# Patient Record
Sex: MALE | Race: BLACK OR AFRICAN AMERICAN | Employment: FULL TIME | URBAN - METROPOLITAN AREA
[De-identification: names, ages, dates, MRNs, and addresses within clinical notes are randomized per-mention and may not be internally consistent; named-entity substitution may affect disease eponyms.]

---

## 2020-06-23 ENCOUNTER — OFFICE VISIT (OUTPATIENT)
Dept: URGENT CARE | Facility: URGENT CARE | Age: 25
End: 2020-06-23
Payer: COMMERCIAL

## 2020-06-23 VITALS
SYSTOLIC BLOOD PRESSURE: 122 MMHG | TEMPERATURE: 98.4 F | OXYGEN SATURATION: 98 % | HEART RATE: 71 BPM | WEIGHT: 289 LBS | DIASTOLIC BLOOD PRESSURE: 80 MMHG

## 2020-06-23 DIAGNOSIS — M54.42 ACUTE LEFT-SIDED LOW BACK PAIN WITH LEFT-SIDED SCIATICA: Primary | ICD-10-CM

## 2020-06-23 PROCEDURE — 99203 OFFICE O/P NEW LOW 30 MIN: CPT | Performed by: FAMILY MEDICINE

## 2020-06-23 RX ORDER — METHYLPREDNISOLONE 4 MG
TABLET, DOSE PACK ORAL
Qty: 21 TABLET | Refills: 0 | Status: SHIPPED | OUTPATIENT
Start: 2020-06-23 | End: 2020-08-28

## 2020-06-23 RX ORDER — TRAMADOL HYDROCHLORIDE 50 MG/1
50 TABLET ORAL EVERY 6 HOURS PRN
Qty: 10 TABLET | Refills: 0 | Status: SHIPPED | OUTPATIENT
Start: 2020-06-23 | End: 2020-06-26

## 2020-06-23 RX ORDER — METHOCARBAMOL 750 MG/1
750 TABLET, FILM COATED ORAL 4 TIMES DAILY
Qty: 28 TABLET | Refills: 0 | Status: SHIPPED | OUTPATIENT
Start: 2020-06-23 | End: 2020-06-30

## 2020-06-23 NOTE — PROGRESS NOTES
SUBJECTIVEHPI: Joseph Mccabe is a 24 year old male who presents for evaluation of back pain.  Symptoms began weeks(s) ago, have been onset gradual and are stable.  Pain is located in the low back left region, with radiation to radiates into the left leg.  Recent injury:none recalled by the patient  Personal hx of back pain is recurrent self limited episodes of low back pain in the past.  Pain is exacerbated by: bending and changing position.  Pain is relieved by: none.  Associated sx include: none.  Red flag symptoms: negative.    No past medical history on file.  No Known Allergies  Social History     Tobacco Use     Smoking status: Never Smoker   Substance Use Topics     Alcohol use: Not on file       ROS:CONSTITUTIONAL:NEGATIVE for fever, chills, change in weight  INTEGUMENTARY/SKIN: NEGATIVE for worrisome rashes, moles or lesions  NEURO: NEGATIVE for weakness, dizziness or paresthesias    OBJECTIVE:  /80   Pulse 71   Temp 98.4  F (36.9  C) (Tympanic)   Wt 131.1 kg (289 lb)   SpO2 98%   Back examination: Back symmetric, no curvature. ROM normal. No CVA tenderness., positive findings: limitation of motion - flexion: Moderate and extension: Moderate, paraspinal muscle spasm, tenderness to palpation .  Straight leg raise test: positive.  GENERAL APPEARANCE: healthy, alert and no distress  ABDOMEN:  soft, nontender, no HSM or masses and bowel sounds normal  NEURO: Normal strength and tone with no weakness or sensory deficit noted, reflexes normal   SKIN: no suspicious lesions or rashes      ICD-10-CM    1. Acute left-sided low back pain with left-sided sciatica  M54.42 methylPREDNISolone (MEDROL DOSEPAK) 4 MG tablet therapy pack     methocarbamol (ROBAXIN) 750 MG tablet     traMADol (ULTRAM) 50 MG tablet     Continue prn heat or ice application.    F/U PCP/IM/FP if persists, ED if worse

## 2020-08-28 ENCOUNTER — ANCILLARY PROCEDURE (OUTPATIENT)
Dept: GENERAL RADIOLOGY | Facility: CLINIC | Age: 25
End: 2020-08-28
Attending: PHYSICIAN ASSISTANT
Payer: COMMERCIAL

## 2020-08-28 ENCOUNTER — OFFICE VISIT (OUTPATIENT)
Dept: INTERNAL MEDICINE | Facility: CLINIC | Age: 25
End: 2020-08-28
Payer: COMMERCIAL

## 2020-08-28 VITALS
DIASTOLIC BLOOD PRESSURE: 82 MMHG | RESPIRATION RATE: 18 BRPM | HEART RATE: 67 BPM | OXYGEN SATURATION: 98 % | SYSTOLIC BLOOD PRESSURE: 128 MMHG | WEIGHT: 298 LBS

## 2020-08-28 DIAGNOSIS — M54.42 LEFT-SIDED LOW BACK PAIN WITH LEFT-SIDED SCIATICA, UNSPECIFIED CHRONICITY: ICD-10-CM

## 2020-08-28 DIAGNOSIS — M54.42 LEFT-SIDED LOW BACK PAIN WITH LEFT-SIDED SCIATICA, UNSPECIFIED CHRONICITY: Primary | ICD-10-CM

## 2020-08-28 PROCEDURE — 99213 OFFICE O/P EST LOW 20 MIN: CPT | Performed by: PHYSICIAN ASSISTANT

## 2020-08-28 PROCEDURE — 72100 X-RAY EXAM L-S SPINE 2/3 VWS: CPT

## 2020-08-28 NOTE — PROGRESS NOTES
Bjorn Mccabe is a 25 year old male who presents to clinic today for the following health issues:    HPI       Back Pain  Onset/Duration: 4 months   Description:   Location of pain: low back left  Character of pain: sharp and dull ache  Pain radiation: radiates into the left buttocks, radiates into the left leg and radiates into the left foot  New numbness or weakness in legs, not attributed to pain: YES  Intensity: Currently 6/10  Progression of Symptoms: worsening  History:   Specific cause: unknown   Pain interferes with job: YES  History of back problems: yes  Any previous MRI or X-rays: None  Sees a specialist for back pain: No  Alleviating factors:   Improved by:   Nothing   Precipitating factors:  Worsened by: Lifting, Bending and Walking  Therapies tried and outcome: muscle relaxants, steroids, rest    Accompanying Signs & Symptoms:  Risk of Fracture: None  Risk of Cauda Equina: None  Risk of Infection: None  Risk of Cancer: None  Risk of Ankylosing Spondylitis: Onset at age <35, male, AND morning back stiffness no            Review of Systems   Constitutional, HEENT, cardiovascular, pulmonary, gi and gu systems are negative, except as otherwise noted.      Objective    /82   Pulse 67   Resp 18   Wt 135.2 kg (298 lb)   SpO2 98%   There is no height or weight on file to calculate BMI.  Physical Exam   GENERAL: healthy, alert and no distress  RESP: lungs clear to auscultation - no rales, rhonchi or wheezes  CV: regular rates and rhythm and normal S1 S2, no S3 or S4  MS: tenderness lower left paralumbar muscles   SLR with pain in the lower left back and partially down leg   SKIN: no suspicious lesions or rashes  NEURO: Normal strength and tone, sensory exam grossly normal and DTR's normal and symmetric     Xray - LUMBAR SPINE TWO TO THREE VIEWS   8/28/2020 11:27 AM      HISTORY: Left-sided low back pain with left-sided sciatica,  unspecified chronicity.     COMPARISON: None.                                                                       IMPRESSION: No fracture is identified. No high-grade degenerative  change. Alignment is significant for slight dextroconvex curvature.  Slight height loss of the T11 vertebral body, probably chronic.  Paraspinal soft tissues are unremarkable.        Assessment & Plan     Joseph was seen today for back pain.    Diagnoses and all orders for this visit:    Left-sided low back pain with left-sided sciatica, unspecified chronicity  -     XR Lumbar Spine 2/3 Views; Future  -     Orthopedic & Spine  Referral; Future         Tobacco Cessation:   reports that he has been smoking. He has never used smokeless tobacco.  Tobacco Cessation Action Plan: per PCP        See ortho spine specialist for further eval given length of symptoms     Return in about 3 months (around 11/28/2020) for Physical Exam, regular primary provider.    Magnolia Torrez PA-C  [facility]

## 2020-09-03 ENCOUNTER — OFFICE VISIT (OUTPATIENT)
Dept: NEUROSURGERY | Facility: CLINIC | Age: 25
End: 2020-09-03
Attending: PHYSICIAN ASSISTANT
Payer: COMMERCIAL

## 2020-09-03 VITALS
DIASTOLIC BLOOD PRESSURE: 81 MMHG | OXYGEN SATURATION: 99 % | SYSTOLIC BLOOD PRESSURE: 144 MMHG | BODY MASS INDEX: 41.44 KG/M2 | HEART RATE: 54 BPM | HEIGHT: 71 IN | WEIGHT: 296 LBS | RESPIRATION RATE: 16 BRPM

## 2020-09-03 DIAGNOSIS — M54.42 LEFT-SIDED LOW BACK PAIN WITH LEFT-SIDED SCIATICA, UNSPECIFIED CHRONICITY: ICD-10-CM

## 2020-09-03 PROCEDURE — G0463 HOSPITAL OUTPT CLINIC VISIT: HCPCS

## 2020-09-03 PROCEDURE — 99203 OFFICE O/P NEW LOW 30 MIN: CPT | Performed by: PHYSICIAN ASSISTANT

## 2020-09-03 ASSESSMENT — MIFFLIN-ST. JEOR: SCORE: 2336.84

## 2020-09-03 ASSESSMENT — PAIN SCALES - GENERAL: PAINLEVEL: EXTREME PAIN (8)

## 2020-09-03 NOTE — PROGRESS NOTES
Neurosurgery Consult    HPI    Mr. Mccabe is a 25-year-old male furred to us for evaluation of low back pain and left leg pain.  The patient states his symptoms have been present for 4 months they radiate down his left leg in an S1 distribution he has numbness in his third fourth and fifth toes on the left.  He has not tried any conservative therapies at this point he has had a lumbar x-ray which is essentially normal has not had a lumbar MRI.  Denies any bowel or bladder symptoms or saddle anesthesia.  Denies any right leg symptoms.    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      B/P: 144/81, T: Data Unavailable, P: 54, R: 16       Exam    Alert and oriented no acute distress  Bilateral lower extremities with 5/5 strength, with the exception of weakness in the left leg with plantar flexion while standing  Reflexes absent patella/ankle  Negative straight leg raise on the right, positive on the left  Negative ankle clonus negative Babinski bilaterally  Lumbar spine nontender to palpation  Gait is normal    Imaging    Lumbar x-ray is unremarkable    Assessment    Left S1 radiculopathy    Plan:      I recommend the patient undergo a lumbar MRI without contrast we will follow-up with him with the results once they are available.    Total time of 30 minutes spent with the patient today greater than 50% spent face to face in counseling and coordination of care.

## 2020-09-03 NOTE — LETTER
9/3/2020         RE: Joseph Mccabe  [address]  [facility] [place] [address]        Dear Colleague,    Thank you for referring your patient, Joseph Mccabe, to the [facility]. Please see a copy of my visit note below.    Neurosurgery Consult    HPI    Mr. Mccabe is a 25-year-old male furred to us for evaluation of low back pain and left leg pain.  The patient states his symptoms have been present for 4 months they radiate down his left leg in an S1 distribution he has numbness in his third fourth and fifth toes on the left.  He has not tried any conservative therapies at this point he has had a lumbar x-ray which is essentially normal has not had a lumbar MRI.  Denies any bowel or bladder symptoms or saddle anesthesia.  Denies any right leg symptoms.    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      B/P: 144/81, T: Data Unavailable, P: 54, R: 16       Exam    Alert and oriented no acute distress  Bilateral lower extremities with 5/5 strength, with the exception of weakness in the left leg with plantar flexion while standing  Reflexes absent patella/ankle  Negative straight leg raise on the right, positive on the left  Negative ankle clonus negative Babinski bilaterally  Lumbar spine nontender to palpation  Gait is normal    Imaging    Lumbar x-ray is unremarkable    Assessment    Left S1 radiculopathy    Plan:      I recommend the patient undergo a lumbar MRI without contrast we will follow-up with him with the results once they are available.    Total time of 30 minutes spent with the patient today greater than 50% spent face to face in counseling and coordination of care.    Again, thank you for allowing me to participate in the care of your patient.        Sincerely,        Lisa Morrison PA-C

## 2020-09-04 ENCOUNTER — HOSPITAL ENCOUNTER (OUTPATIENT)
Dept: MRI IMAGING | Facility: CLINIC | Age: 25
Discharge: HOME | End: 2020-09-04
Attending: PHYSICIAN ASSISTANT | Admitting: PHYSICIAN ASSISTANT
Payer: COMMERCIAL

## 2020-09-04 ENCOUNTER — TELEPHONE (OUTPATIENT)
Dept: NEUROSURGERY | Facility: CLINIC | Age: 25
End: 2020-09-04

## 2020-09-04 DIAGNOSIS — M54.42 LEFT-SIDED LOW BACK PAIN WITH LEFT-SIDED SCIATICA, UNSPECIFIED CHRONICITY: ICD-10-CM

## 2020-09-04 PROCEDURE — 72148 MRI LUMBAR SPINE W/O DYE: CPT

## 2020-09-04 NOTE — TELEPHONE ENCOUNTER
Per Lisa Morrison PA-C: Please have the patient make a follow-up appointment either with Dr. Francis at [facility], or with Dr. Thornton at [facility] to review his lumbar MRI and discuss possible surgical options.    You can let the patient know that he does have a disc herniation as we suspected at his office visit.  Visit could also be with a PA at a time when either of those above listed doctors are available.    Attempted to reach out to patient, no answer. Left voice message for patient to call clinic back to further discuss.

## 2020-09-14 ENCOUNTER — OFFICE VISIT (OUTPATIENT)
Dept: NEUROSURGERY | Facility: CLINIC | Age: 25
End: 2020-09-14
Attending: NEUROLOGICAL SURGERY
Payer: COMMERCIAL

## 2020-09-14 VITALS
WEIGHT: 296 LBS | BODY MASS INDEX: 42.37 KG/M2 | SYSTOLIC BLOOD PRESSURE: 142 MMHG | HEIGHT: 70 IN | DIASTOLIC BLOOD PRESSURE: 87 MMHG | OXYGEN SATURATION: 96 % | HEART RATE: 76 BPM | RESPIRATION RATE: 18 BRPM

## 2020-09-14 DIAGNOSIS — M54.16 SPINAL STENOSIS OF LUMBAR REGION WITH RADICULOPATHY: Primary | ICD-10-CM

## 2020-09-14 DIAGNOSIS — M48.061 SPINAL STENOSIS OF LUMBAR REGION WITH RADICULOPATHY: Primary | ICD-10-CM

## 2020-09-14 DIAGNOSIS — E66.01 MORBID OBESITY (H): ICD-10-CM

## 2020-09-14 PROCEDURE — G0463 HOSPITAL OUTPT CLINIC VISIT: HCPCS

## 2020-09-14 PROCEDURE — 99214 OFFICE O/P EST MOD 30 MIN: CPT | Performed by: NEUROLOGICAL SURGERY

## 2020-09-14 ASSESSMENT — PAIN SCALES - GENERAL: PAINLEVEL: SEVERE PAIN (7)

## 2020-09-14 ASSESSMENT — MIFFLIN-ST. JEOR: SCORE: 2328.9

## 2020-09-14 NOTE — LETTER
9/14/2020         RE: Joseph Mccabe  [address]  [facility] [place] [address]        Dear Colleague,    Thank you for referring your patient, Joseph Mccabe, to the [facility]. Please see a copy of my visit note below.    It was a pleasure to see Joseph Mccabe today in Neurosurgery Clinic. He is a 25 year old male who was recently seen by Lisa White PA-C in our clinic.  He has a six-month history of back and left lower extremity symptoms.  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  His symptoms have been up and down over that time.  He has pain in the back that then goes down into the buttock thigh calf and lateral foot with numbness and tingling in the lateral 3 toes.  He also has some pain on the anterior thigh.  He denies any obvious weakness.  He has minimal symptoms in the right lower extremity but does occasionally have some symptoms on that side.  He has tried a Medrol Dosepak with minimal relief.  He is also taken ibuprofen and Tylenol with mild relief.  He has not done physical therapy or an injection at this time.  He denies any bowel or bladder symptoms.  Activity exacerbates his symptoms.    History reviewed. No pertinent past medical history.  History reviewed. No pertinent surgical history.   No Known Allergies  No current outpatient medications on file.  Social History     Socioeconomic History     Marital status: Single     Spouse name: None     Number of children: None     Years of education: None     Highest education level: None   Occupational History     None   Social Needs     Financial resource strain: None     Food insecurity     Worry: None     Inability: None     Transportation needs     Medical: None     Non-medical: None   Tobacco Use     Smoking status: Current Some Day Smoker     Smokeless tobacco: Never Used     Tobacco comment: social smoker   Substance and Sexual Activity     Alcohol use: None     Drug  "use: None     Sexual activity: None   Lifestyle     Physical activity     Days per week: None     Minutes per session: None     Stress: None   Relationships     Social connections     Talks on phone: None     Gets together: None     Attends [employer] service: None     Active member of club or organization: None     Attends meetings of clubs or organizations: None     Relationship status: None     Intimate partner violence     Fear of current or ex partner: None     Emotionally abused: None     Physically abused: None     Forced sexual activity: None   Other Topics Concern     Parent/sibling w/ CABG, MI or angioplasty before 65F 55M? Not Asked   Social History Narrative     None      Problem (# of Occurrences) Relation (Name,Age of Onset)    Diabetes (1) Paternal Grandmother           ROS: 10 point ROS neg other than the symptoms noted above in the HPI.    Vitals:    09/14/20 1147   BP: (!) 142/87   Pulse: 76   Resp: 18   SpO2: 96%   Weight: 134.3 kg (296 lb)   Height: 1.778 m (5' 10\")     Body mass index is 42.47 kg/m .  Severe Pain (7)    Oswestry (ANN) Questionnaire    OSWESTRY DISABILITY INDEX 9/3/2020   Count 10   Sum 28   Oswestry Score (%) 56   Some recent data might be hidden       Visual Analog Scale (VAS) Questionnaire    No flowsheet data found.       Awake alert and oriented.  Bilateral lower extremity strength is 5 out of 5 in all muscle groups.  Sensation intact light touch.  Reflexes symmetric and normal.  Positive straight leg raise on the left.    Imaging: MRI of the lumbar spine demonstrates multilevel degenerative changes and disc bulges and herniations at L3-4 and L4-5.  The disc herniation on the left at L4-5 appears to be clearly concordant with his symptoms and he may have some mild symptoms related to his issues at L3-4.  The imaging was reviewed with the patient shown to the patient in clinic today.    Assessment: Lumbar stenosis and herniated disc with radiculopathy.    Plan: We will start " with epidural steroid injection and physical therapy, but given the duration of his symptoms we will also begin the scheduling process for a left L3-4, 4 5 minimally invasive decompression and discectomy, possible open here at [facility] in the near future.  We discussed the risk benefits and alternatives the surgery the patient understands this plan and wishes to proceed.        Again, thank you for allowing me to participate in the care of your patient.        Sincerely,        Alejandro Thornton MD

## 2020-09-14 NOTE — PATIENT INSTRUCTIONS
Patient Instructions    Surgery scheduled at [facility] for Left Lumbar 3-4,4-5 minimally invasive bilateral decompression and discectomy. Possible open      with Dr. Thornton     Pre-Operative    Surgical risks: blood clots in the leg or lung, problems urinating, nerve damage, drainage from the incision, infection,stiffness    Pre-operative physical with primary care physician within 30 days of surgical date.     Likely same day procedure with discharge home day of surgery, may stay for 23 hour observation hospitalization for monitoring.       Shower procedure  o Please shower with antimicrobial soap the night before surgery and morning of surgery. Please refer to showering instruction sheet in folder.    Eating/Drinking  o Stop all solid foods 8 hours before surgery.  o Keep drinking clear liquids until 4 hours before surgery  - Clear liquids include water, clear juice, black coffee, or clear tea without milk, Gatorade, clear soda.     Medications  o Hold Aspirin, NSAIDs (Advil/Ibuprofen, Indocin, Naproxen,Nuprin,Relafen/Nabumetone, Diclofenac,Meloxicam, Aleve, Celebrex) x 7 days prior to surgical date  o Hold methotrexate, Xeljanz for 1-2 weeks prior to surgery and continue to hold 2 weeks post surgery.   o You can take Tylenol (Acetaminophen) for pain, 1000 mg  - Do not exceed 3,000 mg per day   o Any other medications prescribed, please discuss with your primary care provider at your pre-operative physical     Pain Management    You will have some post-operative incisional pain which may require pain medications and muscle relaxants. You will receive medication upon discharge.    You may resume taking NSAIDs (ex. Ibuprofen, aleve, naproxen) immediately post-op    Do NOT drive while taking narcotic pain medication    Do NOT drink alcohol while using any pain medication    You can utilize ice as needed (no longer than 20 minutes at one time)    Incision Care    No submerging incision in water such as  pools, hot tubs, baths for at least 8 weeks or until incision is healed    It is okay to shower, just pat the incision dry     Remove dressing as instructed upon discharge    Watch for signs of infection  o Redness, swelling, warmth, drainage, and fever of 101 degrees or higher  o Notify clinic [phone].    Activity Restrictions    For the first 6-8 weeks, no lifting > 10 pounds, limited bending, twisting, or overhead reaching.    Take stairs in moderation     Ok to walk as tolerated, take short frequent walks. You may gradually increase the distance as tolerated.     Avoid bed rest and prolonged sitting for longer than 30 minutes (change positions frequently while awake)    No contact sports until after follow up visit    No high impact activities such as; running/jogging, snowmobile or 4 uribe riding or any other recreational vehicles    Please call the clinic if you develop any of the following symptoms:  o Swelling and/or warmth in one or both legs  o Pain or tenderness in your leg, ankle, foot, or arm   o Red or discolored skin     Post-Op Follow Up Appointments    2 week incision check with RN    6 week post op follow up visit with Physician Assistant    3 months post op with Dr. Thornton     Please call to schedule follow up appointment at [phone]    Resources    If you are currently employed, you will need to be off work for 2-4 weeks for post op recovery and healing.    Please fax any FMLA/short term disability paperwork to [phone]    You may call our clinic when you'd like to return to work and we can provide a work letter    To allow staff adequate time to complete paperwork, please send as soon as possible

## 2020-09-14 NOTE — PROGRESS NOTES
It was a pleasure to see Joseph Mccabe today in Neurosurgery Clinic. He is a 25 year old male who was recently seen by Lisa White PA-C in our clinic.  He has a six-month history of back and left lower extremity symptoms.  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  His symptoms have been up and down over that time.  He has pain in the back that then goes down into the buttock thigh calf and lateral foot with numbness and tingling in the lateral 3 toes.  He also has some pain on the anterior thigh.  He denies any obvious weakness.  He has minimal symptoms in the right lower extremity but does occasionally have some symptoms on that side.  He has tried a Medrol Dosepak with minimal relief.  He is also taken ibuprofen and Tylenol with mild relief.  He has not done physical therapy or an injection at this time.  He denies any bowel or bladder symptoms.  Activity exacerbates his symptoms.    History reviewed. No pertinent past medical history.  History reviewed. No pertinent surgical history.   No Known Allergies  No current outpatient medications on file.  Social History     Socioeconomic History     Marital status: Single     Spouse name: None     Number of children: None     Years of education: None     Highest education level: None   Occupational History     None   Social Needs     Financial resource strain: None     Food insecurity     Worry: None     Inability: None     Transportation needs     Medical: None     Non-medical: None   Tobacco Use     Smoking status: Current Some Day Smoker     Smokeless tobacco: Never Used     Tobacco comment: social smoker   Substance and Sexual Activity     Alcohol use: None     Drug use: None     Sexual activity: None   Lifestyle     Physical activity     Days per week: None     Minutes per session: None     Stress: None   Relationships     Social connections     Talks on phone: None     Gets together: None     Attends  "Congregation service: None     Active member of club or organization: None     Attends meetings of clubs or organizations: None     Relationship status: None     Intimate partner violence     Fear of current or ex partner: None     Emotionally abused: None     Physically abused: None     Forced sexual activity: None   Other Topics Concern     Parent/sibling w/ CABG, MI or angioplasty before 65F 55M? Not Asked   Social History Narrative     None      Problem (# of Occurrences) Relation (Name,Age of Onset)    Diabetes (1) Paternal Grandmother           ROS: 10 point ROS neg other than the symptoms noted above in the HPI.    Vitals:    09/14/20 1147   BP: (!) 142/87   Pulse: 76   Resp: 18   SpO2: 96%   Weight: 134.3 kg (296 lb)   Height: 1.778 m (5' 10\")     Body mass index is 42.47 kg/m .  Severe Pain (7)    Oswestry (ANN) Questionnaire    OSWESTRY DISABILITY INDEX 9/3/2020   Count 10   Sum 28   Oswestry Score (%) 56   Some recent data might be hidden       Visual Analog Scale (VAS) Questionnaire    No flowsheet data found.       Awake alert and oriented.  Bilateral lower extremity strength is 5 out of 5 in all muscle groups.  Sensation intact light touch.  Reflexes symmetric and normal.  Positive straight leg raise on the left.    Imaging: MRI of the lumbar spine demonstrates multilevel degenerative changes and disc bulges and herniations at L3-4 and L4-5.  The disc herniation on the left at L4-5 appears to be clearly concordant with his symptoms and he may have some mild symptoms related to his issues at L3-4.  The imaging was reviewed with the patient shown to the patient in clinic today.    Assessment: Lumbar stenosis and herniated disc with radiculopathy.    Plan: We will start with epidural steroid injection and physical therapy, but given the duration of his symptoms we will also begin the scheduling process for a left L3-4, 4 5 minimally invasive decompression and discectomy, possible open here at [facility] " in the near future.  We discussed the risk benefits and alternatives the surgery the patient understands this plan and wishes to proceed.

## 2020-09-14 NOTE — NURSING NOTE
"Joseph Mccabe is a 25 year old male who presents for:  Chief Complaint   Patient presents with     Neurologic Problem     LBP        Initial Vitals:  BP (!) 142/87   Pulse 76   Resp 18   Ht 5' 10\" (1.778 m)   Wt 296 lb (134.3 kg)   SpO2 96%   BMI 42.47 kg/m   Estimated body mass index is 42.47 kg/m  as calculated from the following:    Height as of this encounter: 5' 10\" (1.778 m).    Weight as of this encounter: 296 lb (134.3 kg).. Body surface area is 2.58 meters squared. BP completed using cuff size: large  Severe Pain (7)    Nursing Comments: Pt present today for LBP.    Kobe Farrell CMA    "

## 2020-09-14 NOTE — NURSING NOTE
Reviewed pre- and post-operative instructions as outlined in the After Visit Summary/Patient Instructions with patient.     Surgery folder was given to patient    Patient Education Topic: Procedure with Dr. Thornton     Learner(s): Patient    Knowledge Level: Basic    Readiness to Learn: Ready    Method:  Verbal explanation and Written material     Outcome: Able to verbalize instructions    Barriers to Learning: No barrier    Patient had the opportunity for questions to be answered. Advised Patient to call clinic with any questions/concerns. Gave patient antibacterial soap for pre-surgery skin preparation.

## 2020-09-17 NOTE — PROGRESS NOTES
[facility] - Procedure Note    Date of Service: 9/18/2020    Procedure performed: LEFT L4-5 transforaminal epidural steroid injection with fluoroscopic guidance  Diagnosis: Lumbar spondylosis; Lumbar radiculitis/radiculopathy  : Vero Wood MD   Anesthesia: none    Indications: Joseph Mccabe is a 25 year old male who is seen at the request of Dr. Thornton for lumbar transforaminal epidural steroid injection. The patient describes left sided low back pain with radiation into the lateral and posterior thigh, calf and into his foot on the left side. The patient has been exhibiting symptoms consistent with lumbar intraspinal inflammation and radiculopathy. Symptoms have been persistent, disabling, and intermittently severe. The patient reports minimal improvement with conservative treatment, including PT and medications.    Lumbar MRI was done on 9/4/2020 which showed:   FINDINGS: The report is dictated assuming five lumbar-type vertebral  bodies, and radiographic correlation may be necessary.  The distal  spinal cord and cauda equina appear normal.  The tip of the conus is  at the T12-L1 level.  The bone marrow appears normal.  The paraspinal  soft tissues appear normal. The patient has a developmentally small  central spinal canal.     T12-L1:   Normal disc, facet joints, spinal canal and neural foramina.      L1-L2:   Normal disc, facet joints, spinal canal and neural foramina.      L2-L3:   Normal disc, facet joints, spinal canal and neural foramina.      L3-L4: Loss of T2 signal from the disc. Loss of disc space height.  There is a moderate-sized broad-based central disc herniation  extending to the right and left of midline. There are mild  degenerative changes in the facet joints with thickening of the  ligamentum flavum. These degenerative changes are leading to severe  central spinal stenosis. There is also bilateral subarticular recess  stenosis. Mild left foraminal  stenosis.     L4-L5:  Loss of T2 signal from the disc. Loss of disc space height.  There is a moderate-sized left central disc herniation causing mass  effect on the dural sac. Disc material extends inferior to the level  of the disc into the left L5 lateral recess. This is displacing the  left L5 nerve root. These findings are best seen on axial images 37-39  and sagittal image 9. The disc fragment extending inferior to the  level of the disc measures about 1.3 cm in cephalocaudad dimension x  0.9 cm in transverse dimension and 0.7 cm in AP dimension. There is  severe central spinal stenosis at this level due to a developmentally  small canal and due to the disc herniation. Mild bilateral foraminal  stenosis.     L5-S1:  The disc has a normal appearance. There are mild degenerative  changes in the facet joints. The central canal and neural foramina are  patent.                                                                   IMPRESSION:    1. Left central L4-L5 disc herniation with a disc fragment extending  into the left L5 lateral recess. This fragment is displacing the left  L5 nerve root. There is also severe central stenosis at this level due  to a developmentally small canal and due to the disc herniation.  2. Moderate-sized central disc herniation at L3-L4. There is severe  central stenosis at this level due to the disc herniation, the  degenerative change off the facet joints, and the developmentally  small central canal.    Allergies:    No Known Allergies     Vitals:  BP (!) 144/93   Pulse 62   SpO2 98%     Review of Systems: The patient denies recent fever, chills, illness, use of antibiotics or anticoagulants. All other 10-point review of systems negative.     Procedure: The procedure and risks were explained, and informed written consent was obtained from the patient. Risks include but are not limited to: infection, bleeding, increased pain, and damage to soft tissue, nerve, muscle, and vasculature  structures. After getting informed consent, patient was brought into the procedure suite and was placed in a prone position on the procedure table. A Pause for the Cause was performed. Patient was prepped and draped in sterile fashion.     After identifying the left L4-5 neuroforamen, the C-arm was rotated to a left lateral oblique angle.  A total of 4.5 mL of Lidocaine 1% was used to anesthetize the skin and the needle track at a skin entry site coaxial with the fluoroscopy beam, and overriding the superior aspect of the neuroforamen.  A 22 gauge 5 inch spinal needle was advanced under intermittent fluoroscopy until it entered the foramen superiorly.    The position was then inspected from anteroposterior and lateral views, and the needle adjusted appropriately.  After negative aspiration, a total of 1 mL of Omnipaque-300 was injected using static and continuous fluoroscopy confirming appropriate position, with spread along the nerve root sheath and into the epidural space, with no intravascular or intrathecal uptake. 9 mL of Omnipaque-300 was wasted.    2mL of 1% lidocaine with 20 mg of dexamethasone was injected.  The needle was removed. Hemostasis was achieved, the area was cleaned, and bandaids were placed when appropriate. Images were saved to PACS.    The patient tolerated the procedure well, and was taken to the recovery room, and there was no evidence of procedural complications. No new sensory or motor deficits were noted following the procedure. The patient was stable and able to ambulate on discharge home. Post-procedure instructions were provided.     Pre-procedure pain score: 8/10 in the back, 7/10 in the leg  Post-procedure pain score: 5/10 in the back, 4/10 in the leg    Assessment/Plan: Joseph Mccabe is a 25 year old male s/p LEFT L4-5 transforaminal epidural steroid injection today for lumbar spondylosis, radiculitis/radiculopathy.     1. Following today's procedure, the patient was advised to  contact the Pain Management Center for any of the following:   Fever, chills, or night sweats   New onset of pain, numbness, or weakness   Any questions/concerns regarding the procedure  If unable to contact the Pain Center, the patient was instructed to go to a local Emergency Room for any complications.   2. The patient will receive a follow-up call in 1 week.  3. The patient should follow-up with the referring provider in 2 weeks for post-procedure evaluation.      TRAE MATOS MD   Pain Management & Addiction Medicine

## 2020-09-18 ENCOUNTER — ANCILLARY PROCEDURE (OUTPATIENT)
Dept: GENERAL RADIOLOGY | Facility: CLINIC | Age: 25
End: 2020-09-18
Attending: ANESTHESIOLOGY
Payer: COMMERCIAL

## 2020-09-18 ENCOUNTER — RADIOLOGY INJECTION OFFICE VISIT (OUTPATIENT)
Dept: PALLIATIVE MEDICINE | Facility: CLINIC | Age: 25
End: 2020-09-18
Attending: NEUROLOGICAL SURGERY
Payer: COMMERCIAL

## 2020-09-18 VITALS — DIASTOLIC BLOOD PRESSURE: 90 MMHG | HEART RATE: 60 BPM | SYSTOLIC BLOOD PRESSURE: 131 MMHG | OXYGEN SATURATION: 99 %

## 2020-09-18 DIAGNOSIS — M54.16 LUMBAR RADICULOPATHY: ICD-10-CM

## 2020-09-18 DIAGNOSIS — M54.16 LUMBAR RADICULOPATHY: Primary | ICD-10-CM

## 2020-09-18 DIAGNOSIS — M54.16 SPINAL STENOSIS OF LUMBAR REGION WITH RADICULOPATHY: ICD-10-CM

## 2020-09-18 DIAGNOSIS — M48.061 SPINAL STENOSIS OF LUMBAR REGION WITH RADICULOPATHY: ICD-10-CM

## 2020-09-18 PROCEDURE — 64483 NJX AA&/STRD TFRM EPI L/S 1: CPT | Mod: LT | Performed by: ANESTHESIOLOGY

## 2020-09-18 NOTE — NURSING NOTE
Discharge Information    IV Discontiued Time:  NA    Amount of Fluid Infused:  NA    Discharge Criteria = When patient returns to baseline or as per MD order    Consciousness:  Pt is fully awake    Circulation:  BP +/- 20% of pre-procedure level    Respiration:  Patient is able to breathe deeply    O2 Sat:  Patient is able to maintain O2 Sat >92% on room air    Activity:  Moves 4 extremities on command    Ambulation:  Patient is able to stand and walk or stand and pivot into wheelchair    Dressing:  Clean/dry or No Dressing    Notes:   Discharge instructions and AVS given to patient    Patient meets criteria for discharge?  YES    Admitted to PCU?  No    Responsible adult present to accompany patient home?  Yes    Signature/Title:    Aileen Munoz RN Care Coordinator  [facility] [place]

## 2020-09-18 NOTE — PATIENT INSTRUCTIONS
[facility] Procedure Discharge Instructions    Today you saw:   Dr. Vero Wood      Your procedure:  Epidural steroid injection       Medications used:  Lidocaine (anesthetic) Dexamethasone (steroid)       Omnipaque (contrast)              Be cautious when walking as numbness and/or weakness in the legs may occur up to 6-8 hours after the procedure due to effect of the local anesthetic    Do not drive for 6 hours. The effect of the local anesthetic could slow your reflexes.     Avoid strenuous activity for the first 24 hours. You may resume your regular activities after that.     You may shower, however avoid swimming, tub baths or hot tubs for 24 hours following your procedure    You may have a mild to moderate increase in pain for several days following the injection.      You may use ice packs for 10-15 minutes, 3 to 4 times a day at the injection site for comfort    Do not use heat to painful areas for 6 to 8 hours. This will give the local anesthetic time to wear off and prevent you from accidentally burning your skin.    Unless you have been directed to avoid the use of anti-inflammatory medications (NSAIDS-ibuprofen, Aleve, Motrin), you may use these medications or Tylenol for pain control if needed.     With diabetes, check your blood sugar more frequently than usual as your blood sugar may be higher than normal for 10-14 days following a steroid injection. Contact your doctor who manages your diabetes if your blood sugar is higher than usual    Possible side effects of steroids that you may experience include flushing, elevated blood pressure, increased appetite, mild headaches and restlessness.  All of these symptoms will get better with time.    It may take up to 14 days for the steroid medication to start working although you may feel the effect as early as a few days after the procedure.     Follow up with your referring provider in 2-3 weeks      If you experience any of the  following, call the pain center line during work hours at [phone] or on-call physician after hours at [phone]:  -Fever over 100 degree F  -Swelling, bleeding, redness, drainage, warmth at the injection site  -Progressive weakness or numbness in your legs   -Loss of bowel or bladder function  -Unusual headache that is not relieved by Tylenol or your regular headache medication  -Unusual new onset of pain that is not improving

## 2020-09-21 ENCOUNTER — THERAPY VISIT (OUTPATIENT)
Dept: PHYSICAL THERAPY | Facility: CLINIC | Age: 25
End: 2020-09-21
Attending: NEUROLOGICAL SURGERY
Payer: COMMERCIAL

## 2020-09-21 DIAGNOSIS — M54.16 SPINAL STENOSIS OF LUMBAR REGION WITH RADICULOPATHY: ICD-10-CM

## 2020-09-21 DIAGNOSIS — G89.29 CHRONIC LEFT-SIDED LOW BACK PAIN WITH LEFT-SIDED SCIATICA: Primary | ICD-10-CM

## 2020-09-21 DIAGNOSIS — M48.061 SPINAL STENOSIS OF LUMBAR REGION WITH RADICULOPATHY: ICD-10-CM

## 2020-09-21 DIAGNOSIS — M54.42 CHRONIC LEFT-SIDED LOW BACK PAIN WITH LEFT-SIDED SCIATICA: Primary | ICD-10-CM

## 2020-09-21 PROCEDURE — 97110 THERAPEUTIC EXERCISES: CPT | Mod: GP | Performed by: PHYSICAL THERAPIST

## 2020-09-21 PROCEDURE — 97161 PT EVAL LOW COMPLEX 20 MIN: CPT | Mod: GP | Performed by: PHYSICAL THERAPIST

## 2020-09-21 NOTE — PROGRESS NOTES
[facility] Initial Evaluation  Subjective:  The history is provided by the patient. No  was used.   Patient Health History  Joseph Mccabe being seen for LBP.     Problem began: 3/21/2020.   Problem occurred: possible from lifting things   Pain is reported as 5/10 on pain scale.  General health as reported by patient is good.  Health conditions: see EPIC.   Red flags:  None as reported by patient.     Surgeries include:  None.    Current medications: see EPIC.    Current occupation is not working right now (was working in [email or web address] about 6 weeks ago).                     Therapist Generated HPI Evaluation         Type of problem:  Lumbar.    This is a chronic condition.  Condition occurred with:  Lifting.    Patient reports pain:  Lumbar spine left, lower lumbar spine and central lumbar spine.    Pain radiates to:  Gluteals left, thigh left and foot left.     Associated symptoms:  Loss of strength, loss of motion/stiffness, numbness and tingling. Symptoms are exacerbated by lifting (prolonged sitting 45-60)  Relieved by: injection.                              Objective:  Standing Alignment:        Lumbar:  Lordosis incr and anterior pelvic tilt                           Lumbar/SI Evaluation  ROM:  Arom wnl lumbar: prone lying better during, VY good during, better afterwards.  AROM Lumbar:   Flexion:          Min loss, feels weird into Left leg  Ext:                    Min loss central Low back   Side Bend:        Left:  WNL no limits    Right:  WNL no limits  Rotation:           Left:     Right:   Side Glide:        Left:     Right:         Strength: negative heel walks, slight + L toe walks with L heel dropping a little  Lumbar Myotomes:    T12-L3 (Hip Flex):  Left: 5    Right: 5  L2-4 (Quads):  Left:  5    Right:  5  L4 (Ankle DF):  Left:  5    Right:  5    S1 (Toe Raise):  Left: 4+    Right: 5        Neural Tension/Mobility:    Left side:  SLR (slight tension at 60 deg)  positive.     Right side:   SLR  negative.                                                        General     ROS    Assessment/Plan:    Patient is a 25 year old male with lumbar complaints.    Patient has the following significant findings with corresponding treatment plan.                Diagnosis 1:  LBP chronic    Pain -  hot/cold therapy, splint/taping/bracing/orthotics, self management, education, directional preference exercise and home program  Decreased ROM/flexibility - manual therapy and therapeutic exercise  Decreased strength - therapeutic exercise and therapeutic activities  Decreased function - therapeutic activities  Impaired posture - neuro re-education    Previous and current functional limitations:  (See Goal Flow Sheet for this information)    Short term and Long term goals: (See Goal Flow Sheet for this information)     Communication ability:  Patient appears to be able to clearly communicate and understand verbal and written communication and follow directions correctly.  Treatment Explanation - The following has been discussed with the patient:   RX ordered/plan of care  Anticipated outcomes  Possible risks and side effects  This patient would benefit from PT intervention to resume normal activities.   Rehab potential is good.    Frequency:  1 X week, once daily  Duration:  for 6 weeks  Discharge Plan:  Achieve all LTG.  Independent in home treatment program.  Reach maximal therapeutic benefit.    Please refer to the daily flowsheet for treatment today, total treatment time and time spent performing 1:1 timed codes.

## 2020-09-28 ENCOUNTER — THERAPY VISIT (OUTPATIENT)
Dept: PHYSICAL THERAPY | Facility: CLINIC | Age: 25
End: 2020-09-28
Payer: COMMERCIAL

## 2020-09-28 DIAGNOSIS — M54.16 SPINAL STENOSIS OF LUMBAR REGION WITH RADICULOPATHY: ICD-10-CM

## 2020-09-28 DIAGNOSIS — M48.061 SPINAL STENOSIS OF LUMBAR REGION WITH RADICULOPATHY: ICD-10-CM

## 2020-09-28 DIAGNOSIS — M54.42 CHRONIC LEFT-SIDED LOW BACK PAIN WITH LEFT-SIDED SCIATICA: ICD-10-CM

## 2020-09-28 DIAGNOSIS — G89.29 CHRONIC LEFT-SIDED LOW BACK PAIN WITH LEFT-SIDED SCIATICA: ICD-10-CM

## 2020-09-28 PROCEDURE — 97110 THERAPEUTIC EXERCISES: CPT | Mod: GP | Performed by: PHYSICAL THERAPIST

## 2020-09-28 PROCEDURE — 97112 NEUROMUSCULAR REEDUCATION: CPT | Mod: GP | Performed by: PHYSICAL THERAPIST

## 2020-09-29 ENCOUNTER — TELEPHONE (OUTPATIENT)
Dept: PALLIATIVE MEDICINE | Facility: CLINIC | Age: 25
End: 2020-09-29

## 2020-09-29 NOTE — TELEPHONE ENCOUNTER
Patient had a  LEFT L4-5 transforaminal epidural steroid injection   on 9/18/20.  Called patient for an update.      Left message that we were calling for an update about how he was doing after the injection.  LM that if he has any problems or questions to call the clinic at [phone].

## 2021-01-22 PROBLEM — M48.061 SPINAL STENOSIS OF LUMBAR REGION WITH RADICULOPATHY: Status: RESOLVED | Noted: 2020-09-14 | Resolved: 2021-01-22

## 2021-01-22 PROBLEM — G89.29 CHRONIC LEFT-SIDED LOW BACK PAIN WITH LEFT-SIDED SCIATICA: Status: RESOLVED | Noted: 2020-09-21 | Resolved: 2021-01-22

## 2021-01-22 PROBLEM — M54.16 SPINAL STENOSIS OF LUMBAR REGION WITH RADICULOPATHY: Status: RESOLVED | Noted: 2020-09-14 | Resolved: 2021-01-22

## 2021-01-22 PROBLEM — M54.42 CHRONIC LEFT-SIDED LOW BACK PAIN WITH LEFT-SIDED SCIATICA: Status: RESOLVED | Noted: 2020-09-21 | Resolved: 2021-01-22

## 2021-01-22 NOTE — PROGRESS NOTES
Discharge Note    Progress reporting period is from last progress note on   to Sep 28, 2020.    Joseph failed to follow up and current status is unknown.  Please see information below for last relevant information on current status.  Patient seen for 2 visits.    SUBJECTIVE  Subjective changes noted by patient:  back is feeilng better - lots better.  some knee pain lateral fat pad, pain with bending knee and going up stairs.   Changes in function:  Yes (See Goal flowsheet attached for changes in current functional level)  Adverse reaction to treatment or activity: None    OBJECTIVE  Changes noted in objective findings: very good tolerance to prone ROM - ok to progress strength     ASSESSMENT/PLAN  Diagnosis: LBP, s/p injection last Friday   Updated problem list and treatment plan:   Pain - HEP  Decreased ROM/flexibility - HEP  STG/LTGs have been met or progress has been made towards goals:  Yes, please see goal flowsheet for most current information  Assessment of Progress: current status is unknown.    Last current status:     Self Management Plans:  HEP  I have re-evaluated this patient and find that the nature, scope, duration and intensity of the therapy is appropriate for the medical condition of the patient.  Joseph continues to require the following intervention to meet STG and LTG's:  HEP.    Recommendations:  Discharge with current home program.  Patient to follow up with MD as needed.    Please refer to the daily flowsheet for treatment today, total treatment time and time spent performing 1:1 timed codes.

## 2021-03-01 ENCOUNTER — ANCILLARY PROCEDURE (OUTPATIENT)
Dept: GENERAL RADIOLOGY | Facility: CLINIC | Age: 26
End: 2021-03-01
Attending: FAMILY MEDICINE
Payer: COMMERCIAL

## 2021-03-01 ENCOUNTER — OFFICE VISIT (OUTPATIENT)
Dept: ORTHOPEDICS | Facility: CLINIC | Age: 26
End: 2021-03-01
Payer: COMMERCIAL

## 2021-03-01 VITALS — HEIGHT: 71 IN | BODY MASS INDEX: 40.88 KG/M2 | WEIGHT: 292 LBS

## 2021-03-01 DIAGNOSIS — G89.29 CHRONIC PAIN OF LEFT KNEE: ICD-10-CM

## 2021-03-01 DIAGNOSIS — M76.52 PATELLAR TENDINITIS, LEFT KNEE: ICD-10-CM

## 2021-03-01 DIAGNOSIS — M25.562 CHRONIC PAIN OF LEFT KNEE: ICD-10-CM

## 2021-03-01 DIAGNOSIS — G89.29 CHRONIC PAIN OF LEFT KNEE: Primary | ICD-10-CM

## 2021-03-01 DIAGNOSIS — M25.562 CHRONIC PAIN OF LEFT KNEE: Primary | ICD-10-CM

## 2021-03-01 PROCEDURE — 99204 OFFICE O/P NEW MOD 45 MIN: CPT | Performed by: FAMILY MEDICINE

## 2021-03-01 PROCEDURE — 73562 X-RAY EXAM OF KNEE 3: CPT | Mod: LT | Performed by: RADIOLOGY

## 2021-03-01 RX ORDER — DICLOFENAC SODIUM 75 MG/1
75 TABLET, DELAYED RELEASE ORAL 2 TIMES DAILY
Qty: 28 TABLET | Refills: 0 | Status: SHIPPED | OUTPATIENT
Start: 2021-03-01

## 2021-03-01 ASSESSMENT — MIFFLIN-ST. JEOR: SCORE: 2326.63

## 2021-03-01 NOTE — LETTER
3/1/2021         RE: Joseph Mccabe  [address]  [place] [address]        Dear Colleague,    Thank you for referring your patient, Joseph Mccabe, to the [facility] [place]. Please see a copy of my visit note below.          SPORTS & ORTHOPEDIC WALK-IN VISIT 3/1/2021    Primary Care Physician: Keyla Fernando    10+ months ago started to notice L knee pain.   Does notice a pocket of swelling on the anterior lateral aspect of his L knee.  This will cause him pain as he WB and does strenuous activity.    Lifting from ground, stairs, prolonged motion. Will notice pain with activity and afterwards.    Reason for visit:     What part of your body is injured / painful?  left knee    What caused the injury /pain? No inciting event     How long ago did your injury occur or pain begin? problem is longstanding    What are your most bothersome symptoms? Pain and Swelling, tingling will be noticeable as he achieves extension ROM    How would you characterize your symptom?  aching and dull    What makes your symptoms better? Rest, ice/heat    What makes your symptoms worse? Standing, Walking and Movement    Have you been previously seen for this problem? No    Medical History:    Any recent changes to your medical history? No    Any new medication prescribed since last visit? No    Have you had surgery on this body part before? 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 No    Do you have any vision problems? No    Do you have any chest pain or edema? No    Do you have any shortness of breath or wheezing?  No    Do you have stomach problems? No    Do you have any numbness or focal weakness? No    Do you have diabetes? No    Do you have problems with bleeding or clotting? No    Do you have an rashes or other skin lesions?  "No           CHIEF COMPLAINT:  Pain of the Left Knee       HISTORY OF PRESENT ILLNESS  Mr. Mccabe is a pleasant 25 year old year old male who presents to clinic today with left knee pain.  10+ months ago started to notice L knee pain.  Does notice a pocket of swelling on the anterior lateral aspect of his L knee.    This will cause him pain as he WB and does strenuous activity.  Lifting from ground, stairs, prolonged motion. Will notice pain with activity and afterwards.       What part of your body is injured / painful?  left knee    What caused the injury /pain? No inciting event     How long ago did your injury occur or pain begin? problem is longstanding    What are your most bothersome symptoms? Pain and Swelling, tingling will be noticeable as he achieves extension ROM    How would you characterize your symptom?  aching and dull    What makes your symptoms better? Rest, ice/heat    What makes your symptoms worse? Standing, Walking and Movement    Have you been previously seen for this problem? No    Additional history: as documented    MEDICAL HISTORY  Patient Active Problem List   Diagnosis     Morbid obesity (H)       No current outpatient medications on file.       No Known Allergies    Family History   Problem Relation Age of Onset     Diabetes Paternal Grandmother        Additional medical/Social/Surgical histories reviewed in [facility] and updated as appropriate.     REVIEW OF SYSTEMS (3/1/2021)  A 10-point review of systems was obtained and is negative except for as noted in the HPI.      PHYSICAL EXAM  Ht 1.803 m (5' 11\")   Wt 132.5 kg (292 lb)   BMI 40.73 kg/m      General  - normal appearance, in no obvious distress  Musculoskeletal - left knee  - stance: normal gait without limp, painful single leg squat, no obvious leg length discrepancy  - inspection: Mild swelling hoffas fat pad, normal muscle tone, normal bone and joint alignment, no obvious deformity  - palpation: no joint line tenderness, normal " popliteal pulse, tender over distal aspect of the patellar tendon  - ROM: 135 degrees flexion, -5 degrees extension, painful passive flexion terminally  - strength: 5/5 in flexion, 5/5 in extension  - neuro: no sensory or motor deficit  - special tests:  (-) Lachman  (-) anterior drawer  (-) posterior drawer  (-) Yoanna  (-) varus at 0 and 30 degrees flexion  (-) valgus at 0 and 30 degrees flexion  (-) Luther s compression test  (-) patellar apprehension  Neuro  - no sensory or motor deficit, grossly normal coordination, normal muscle tone  Skin  - no ecchymosis, erythema, warmth, or induration, no obvious rash  Psych  - interactive, appropriate, normal mood and affect    IMAGING :XR knee 3V left . Final results and radiologist's interpretation, available in the [facility] health record. Images were reviewed with the patient/family members in the office today. My personal interpretation of the performed imaging is no acute osseous abnormalities.  Mild osteoarthritis of knee with peaking.     ASSESSMENT & PLAN  Mr. Mccabe is a 25 year old year old male who presents to clinic today with chronic pain of left knee.  Tenderness today on exam centered around patellar tendon. Additionally suspected underlying chondromalacia.      Diagnosis:   Chronic pain of left knee    -Start with home exercise program for patellar tendon and chondromalacia patella  -Diclofenac BID x 10 days  -Discussed diagnostic/therepeutic steroid injection given mild degenerative changes, will consider at a later date  -Avoiding provocative activities  -Follow up 4 weeks; consider CSI, consider advanced imaging if no improvement    It was a pleasure seeing [place] today.    Yohan Hutson DO, CAQSM  Primary Care Sports Medicine

## 2021-03-01 NOTE — PROGRESS NOTES
"CHIEF COMPLAINT:  Pain of the Left Knee       HISTORY OF PRESENT ILLNESS  Mr. Mccabe is a pleasant 25 year old year old male who presents to clinic today with left knee pain.  10+ months ago started to notice L knee pain.  Does notice a pocket of swelling on the anterior lateral aspect of his L knee.    This will cause him pain as he WB and does strenuous activity.  Lifting from ground, stairs, prolonged motion. Will notice pain with activity and afterwards.       What part of your body is injured / painful?  left knee    What caused the injury /pain? No inciting event     How long ago did your injury occur or pain begin? problem is longstanding    What are your most bothersome symptoms? Pain and Swelling, tingling will be noticeable as he achieves extension ROM    How would you characterize your symptom?  aching and dull    What makes your symptoms better? Rest, ice/heat    What makes your symptoms worse? Standing, Walking and Movement    Have you been previously seen for this problem? No    Additional history: as documented    MEDICAL HISTORY  Patient Active Problem List   Diagnosis     Morbid obesity (H)       No current outpatient medications on file.       No Known Allergies    Family History   Problem Relation Age of Onset     Diabetes Paternal Grandmother        Additional medical/Social/Surgical histories reviewed in [address] and updated as appropriate.     REVIEW OF SYSTEMS (3/1/2021)  A 10-point review of systems was obtained and is negative except for as noted in the HPI.      PHYSICAL EXAM  Ht 1.803 m (5' 11\")   Wt 132.5 kg (292 lb)   BMI 40.73 kg/m      General  - normal appearance, in no obvious distress  Musculoskeletal - left knee  - stance: normal gait without limp, painful single leg squat, no obvious leg length discrepancy  - inspection: Mild swelling hoffas fat pad, normal muscle tone, normal bone and joint alignment, no obvious deformity  - palpation: no joint line tenderness, normal popliteal " pulse, tender over distal aspect of the patellar tendon  - ROM: 135 degrees flexion, -5 degrees extension, painful passive flexion terminally  - strength: 5/5 in flexion, 5/5 in extension  - neuro: no sensory or motor deficit  - special tests:  (-) Lachman  (-) anterior drawer  (-) posterior drawer  (-) Yoanna  (-) varus at 0 and 30 degrees flexion  (-) valgus at 0 and 30 degrees flexion  (-) Luther s compression test  (-) patellar apprehension  Neuro  - no sensory or motor deficit, grossly normal coordination, normal muscle tone  Skin  - no ecchymosis, erythema, warmth, or induration, no obvious rash  Psych  - interactive, appropriate, normal mood and affect    IMAGING :XR knee 3V left . Final results and radiologist's interpretation, available in the [facility] health record. Images were reviewed with the patient/family members in the office today. My personal interpretation of the performed imaging is no acute osseous abnormalities.  Mild osteoarthritis of knee with peaking.     ASSESSMENT & PLAN  Mr. Mccabe is a 25 year old year old male who presents to clinic today with chronic pain of left knee.  Tenderness today on exam centered around patellar tendon. Additionally suspected underlying chondromalacia.      Diagnosis:   Chronic pain of left knee    -Start with home exercise program for patellar tendon and chondromalacia patella  -Diclofenac BID x 10 days  -Discussed diagnostic/therepeutic steroid injection given mild degenerative changes, will consider at a later date  -Avoiding provocative activities  -Follow up 4 weeks; consider CSI, consider advanced imaging if no improvement    It was a pleasure seeing Joseph today.    Yohan Hutson DO, CAQSM  Primary Care Sports Medicine

## 2021-03-01 NOTE — PROGRESS NOTES
SPORTS & ORTHOPEDIC WALK-IN VISIT 3/1/2021    Primary Care Physician: Dr. Diaz, Keyla Moody    10+ months ago started to notice L knee pain.   Does notice a pocket of swelling on the anterior lateral aspect of his L knee.  This will cause him pain as he WB and does strenuous activity.    Lifting from ground, stairs, prolonged motion. Will notice pain with activity and afterwards.    Reason for visit:     What part of your body is injured / painful?  left knee    What caused the injury /pain? No inciting event     How long ago did your injury occur or pain begin? problem is longstanding    What are your most bothersome symptoms? Pain and Swelling, tingling will be noticeable as he achieves extension ROM    How would you characterize your symptom?  aching and dull    What makes your symptoms better? Rest, ice/heat    What makes your symptoms worse? Standing, Walking and Movement    Have you been previously seen for this problem? No    Medical History:    Any recent changes to your medical history? No    Any new medication prescribed since last visit? No    Have you had surgery on this body part before? 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 No    Do you have any vision problems? No    Do you have any chest pain or edema? No    Do you have any shortness of breath or wheezing?  No    Do you have stomach problems? No    Do you have any numbness or focal weakness? No    Do you have diabetes? No    Do you have problems with bleeding or clotting? No    Do you have an rashes or other skin lesions? No

## 2021-03-02 NOTE — PATIENT INSTRUCTIONS
Patellar Tendonitis (Jumper's Knee)    WHAT IS JUMPER S KNEE?    Jumper s knee is a problem with the tendon that connects your kneecap to your shinbone. Tendons are strong bands of tissue that connect muscle to bone. They can be injured suddenly or they may be slowly damaged over time. You can have tiny or partial tears in your tendon. If you have a complete tear of your tendon, it is called a rupture. Other tendon injuries may be called a strain, tendinosis, or tendonitis. Jumper's knee is also called a patellar tendon injury, or patellar tendinopathy.    WHAT IS THE CAUSE?    Jumper's knee can be caused by:    Overuse of the tendon from a sport or work activity that involves your knees, such as too much jumping, running, walking, or bicycling  A sudden activity that twists or tears your tendon, such as a fall or an accident  Jumper s knee can also happen if your hips, legs, knees, or feet are not aligned properly. People whose hips are wide, who are knock-kneed, or who have feet with arches that collapse when they walk or run can have this problem.    WHAT ARE THE SYMPTOMS?    Symptoms may include:    Pain and tenderness around your knee and behind your kneecap  Pain when you bend or straighten your leg  Pain when you jump, run, or walk, especially downhill or down stairs  Swelling in your knee joint or where your tendon attaches to your shinbone  If your tendon is torn, usually you will have sudden severe pain and you will not be able to straighten your leg or walk.    HOW IS IT DIAGNOSED?    Your healthcare provider will examine you and ask about your symptoms, activities, and medical history. You may have X-rays or other scans.    HOW IS IT TREATED?    You will need to change or stop doing the activities that cause pain until the tendon has healed. For example, swim instead of run.    You may need to wear a special strap that goes over your patellar tendon or a knee brace that helps support and protect your  knee while your tendon heals. Special shoes or shoe inserts may also help.    Your healthcare provider may recommend stretching and strengthening exercises to help you heal.    If your tendon is torn, you may need surgery to repair the tendon.    The pain often gets better within a few weeks with self-care, but some injuries may take several months or longer to heal. It s important to follow all of your healthcare provider s instructions.    HOW CAN I TAKE CARE OF MYSELF?    To help the swelling and pain:    Put an ice pack, gel pack, or package of frozen vegetables wrapped in a cloth, on the area every 3 to 4 hours for up to 20 minutes at a time.  Keep your knee up on a pillow when you sit or lie down.  Take pain medicine, such as acetaminophen, ibuprofen, or other medicine as directed by your provider. Nonsteroidal anti-inflammatory medicines (NSAIDs), such as ibuprofen, may cause stomach bleeding and other problems. These risks increase with age. Read the label and take as directed. Unless recommended by your healthcare provider, do not take for more than 10 days.  Follow your healthcare provider's instructions, including any exercises recommended by your provider. Ask your provider:    How and when you will hear your test results  How long it will take to recover  What activities you should avoid, including how much you can lift, and when you can return to your normal activities  How to take care of yourself at home  What symptoms or problems you should watch for and what to do if you have them  Make sure you know when you should come back for a checkup.    HOW CAN I HELP PREVENT JUMPER S KNEE?    Warm-up exercises and stretching before activities can help prevent injuries. For example, do stretches and exercises that strengthen your thigh muscles. If your knee hurts after exercise, putting ice on it may help keep it from getting injured.    Follow the safety rules for your work or sport and use protective  equipment, like wearing the right type of shoes for your activities.    Developed by [employer].  Published by [employer].  Copyright  2014 [employer] and/or one of its subsidiaries. All rights reserved.    References    Patellar Tendonitis Exercises    You can do the first 4 exercises right away. When you have less pain in your knee, you can do the remaining exercises.    Standing hamstring stretch: Put the heel of the leg on your injured side on a stool about 15 inches high. Keep your leg straight. Lean forward, bending at the hips, until you feel a mild stretch in the back of your thigh. Make sure you don't roll your shoulders or bend at the waist when doing this or you will stretch your lower back instead of your leg. Hold the stretch for 15 to 30 seconds. Repeat 3 times.    Quadriceps stretch: Stand at an arm's length away from the wall with your injured side farthest from the wall. Facing straight ahead, brace yourself by keeping one hand against the wall. With your other hand, grasp the ankle on your injured side and pull your heel toward your buttocks. Don't arch or twist your back. Keep your knees together. Hold this stretch for 15 to 30 seconds.    Side-lying leg lift: Lie on your uninjured side. Tighten the front thigh muscles on your injured leg and lift that leg 8 to 10 inches (20 to 25 centimeters) away from the other leg. Keep the leg straight and lower it slowly. Do 2 sets of 15.  Rectus femoris stretch: Kneel on your injured knee on a padded surface. Place your other leg in front of you with your foot flat on the floor. Keep your head and chest facing forward and upright and grab the ankle behind you. Gently bring your ankle back toward your buttocks until you feel a stretch in the front of your thigh. Hold 15 to 30 seconds. Repeat 2 to 3 times.    Straight leg raise: Lie on your back with your legs straight out in front of you. Bend the knee on your uninjured side and place the foot  flat on the floor. Tighten the thigh muscle on your injured side and lift your leg about 8 inches off the floor. Keep your leg straight and your thigh muscle tight. Slowly lower your leg back down to the floor. Do 2 sets of 15.    Prone hip extension: Lie on your stomach with your legs straight out behind you. Fold your arms under your head and rest your head on your arms. Draw your belly button in towards your spine and tighten your abdominal muscles. Tighten the buttocks and thigh muscles of the leg on your injured side and lift the leg off the floor about 8 inches. Keep your leg straight. Hold for 5 seconds. Then lower your leg and relax. Do 2 sets of 15.    Clam exercise: Lie on your uninjured side with your hips and knees bent and feet together. Slowly raise your top leg toward the ceiling while keeping your heels touching each other. Hold for 2 seconds and lower slowly. Do 2 sets of 15 repetitions.    Step-up: Stand with the foot of your injured leg on a support 3 to 5 inches (8 to 13 centimeters) high --like a small step or block of wood. Keep your other foot flat on the floor. Shift your weight onto the injured leg on the support. Straighten your injured leg as the other leg comes off the floor. Return to the starting position by bending your injured leg and slowly lowering your uninjured leg back to the floor. Do 2 sets of 15.    Wall squat with a ball: Stand with your back, shoulders, and head against a wall. Look straight ahead. Keep your shoulders relaxed and your feet 3 feet (90 centimeters) from the wall and shoulder's width apart. Place a soccer or basketball-sized ball behind your back. Keeping your back against the wall, slowly squat down to a 45-degree angle. Your thighs will not yet be parallel to the floor. Hold this position for 10 seconds and then slowly slide back up the wall. Repeat 10 times. Build up to 2 sets of 15.    Knee stabilization: Wrap a piece of elastic tubing around the ankle of  your uninjured leg. Tie a knot in the other end of the tubing and close it in a door at about ankle height.    Stand facing the door on the leg without tubing (your injured leg) and bend your knee slightly, keeping your thigh muscles tight. Stay in this position while you move the leg with the tubing (the uninjured leg) straight back behind you. Do 2 sets of 15.  Turn 90 degrees so the leg without tubing is closest to the door. Move the leg with tubing away from your body. Do 2 sets of 15.  Turn 90 degrees again so your back is to the door. Move the leg with tubing straight out in front of you. Do 2 sets of 15.  Turn your body 90 degrees again so the leg with tubing is closest to the door. Move the leg with tubing across your body. Do 2 sets of 15.  Hold onto a chair if you need help balancing. This exercise can be made more challenging by standing on a firm pillow or foam mat while you move the leg with tubing.    Resisted terminal knee extension: Make a loop with a piece of elastic tubing by tying a knot in both ends. Close the knot in a door at knee height. Step into the loop with your injured leg so the tubing is around the back of your knee. Lift the other foot off the ground and hold onto a chair for balance, if needed. Bend the knee with tubing about 45 degrees. Slowly straighten your leg, keeping your thigh muscle tight as you do this. Repeat 15 times. Do 2 sets of 15. If you need an easier way to do this, stand on both legs for better support while you do the exercise.  Decline single-leg squat: Stand with both feet on an angled platform or with your heels on a board about 3 inches (8 centimeters) high. Put all of your weight on your injured leg and squat down to a 45-degree angle. Use your other leg to help you return to a standing position from the squat. You should lower your body to a squat using only your injured leg but you can use both legs to return to standing. When this exercise gets easy, hold  weights in your hands to make the exercise more difficult. Do 2 sets of 15.    Developed by [employer].  Published by [employer].  Copyright  2014 [employer] and/or one of its subsidiaries. All rights reserved.    References

## 2021-10-14 ENCOUNTER — TELEPHONE (OUTPATIENT)
Dept: NEUROSURGERY | Facility: CLINIC | Age: 26
End: 2021-10-14

## 2021-10-14 NOTE — TELEPHONE ENCOUNTER
Reason for call: Yelitza from [facility] called to ask if their request for a narrative report has been completed. She would like a call back either way at [phone]. Thank you

## 2021-10-25 NOTE — TELEPHONE ENCOUNTER
Returned call and spoke to Yelitza with [employer]. Informed her that patient's medical records on file act as legal documentation and advised she contact the medical records department. She reported she had that information and will have her  review the records they have in regards to patients WC injury.

## 2023-02-05 NOTE — NURSING NOTE
"Joseph Mccabe is a 25 year old male who presents for:  Chief Complaint   Patient presents with     Neurologic Problem     left sided LBP with left sided sciatica        Initial Vitals:  BP (!) 144/81   Pulse 54   Resp 16   Ht 5' 10.5\" (1.791 m)   Wt 296 lb (134.3 kg)   SpO2 99%   BMI 41.87 kg/m   Estimated body mass index is 41.87 kg/m  as calculated from the following:    Height as of this encounter: 5' 10.5\" (1.791 m).    Weight as of this encounter: 296 lb (134.3 kg).. Body surface area is 2.58 meters squared. BP completed using cuff size: large  Extreme Pain (8)    Nursing Comments: Pt present today for back issues.    Kobe Farrell CMA    "
(+2) average, normal/Bilateral: